# Patient Record
Sex: FEMALE | Race: OTHER | HISPANIC OR LATINO | URBAN - METROPOLITAN AREA
[De-identification: names, ages, dates, MRNs, and addresses within clinical notes are randomized per-mention and may not be internally consistent; named-entity substitution may affect disease eponyms.]

---

## 2023-12-02 ENCOUNTER — EMERGENCY (EMERGENCY)
Facility: HOSPITAL | Age: 48
LOS: 1 days | Discharge: HOME | End: 2023-12-02
Attending: EMERGENCY MEDICINE | Admitting: EMERGENCY MEDICINE
Payer: MEDICAID

## 2023-12-02 VITALS
DIASTOLIC BLOOD PRESSURE: 72 MMHG | HEART RATE: 64 BPM | SYSTOLIC BLOOD PRESSURE: 105 MMHG | RESPIRATION RATE: 16 BRPM | TEMPERATURE: 98 F | OXYGEN SATURATION: 100 %

## 2023-12-02 PROCEDURE — 99283 EMERGENCY DEPT VISIT LOW MDM: CPT

## 2023-12-02 NOTE — ED PROVIDER NOTE - PATIENT PORTAL LINK FT
You can access the FollowMyHealth Patient Portal offered by [facility] by registering at the following website: [email or web address]. By joining [facility]’s FollowMyHealth portal, you will also be able to view your health information using other applications (apps) compatible with our system.

## 2023-12-02 NOTE — ED PROVIDER NOTE - CLINICAL SUMMARY MEDICAL DECISION MAKING FREE TEXT BOX
Right minimally painful breast lump.  Patient requires outpatient imaging.  This is not appear to be an abscess.  I spoke with patient at length about outpatient mammogram and ultrasound.  Will ask for discharge labs to help facilitate appointment with GYN and mammogram.  Ibuprofen in the meanwhile.

## 2023-12-02 NOTE — ED PROVIDER NOTE - NSPTACCESSSVCSAPPTDETAILS_ED_ALL_ED_FT
Patient requires routine OB/GYN care but requires expedited care due to a breast lump found.  Patient requires immediate referral in order to get mammogram and breast ultrasound set up.

## 2023-12-02 NOTE — ED PROVIDER NOTE - PHYSICAL EXAMINATION
Right breast shows a 2 cm firm nodule in the 10 o'clock position of the right breast.  There is no overlying skin changes or edema or edema.  There is no nipple discharge.  There is no puckering of the skin.  It is located approximately 2 cm out from the areola.

## 2023-12-02 NOTE — ED PROVIDER NOTE - OBJECTIVE STATEMENT
48-year-old female with no significant past medical history presents the ED with right breast lump that was noted today while taking a shower.  Patient's last mammogram was in March of this year with no significant findings.  Patient notes that lump is minimally painful upon palpation.  Patient denies any skin changes or trauma or nipple discharge.  Patient denies any fevers or chills.  Patient's mother had breast cancer but that was due to 9/11.  No other family history of breast cancer.  Patient does not currently have a GYN and travels for work often.

## 2023-12-02 NOTE — ED PROVIDER NOTE - NSFOLLOWUPINSTRUCTIONS_ED_ALL_ED_FT
You may take ibuprofen 400 mg every 6-8 hours as needed for pain.  Please return to the ED if you notice any discharge in the nipple, fevers or chills or any changes of the skin.  To schedule-year-old mammogram ordered for facilities you may call the [facility] for advanced medicine at [phone]

## 2023-12-04 PROBLEM — Z78.9 OTHER SPECIFIED HEALTH STATUS: Chronic | Status: ACTIVE | Noted: 2023-12-02

## 2023-12-20 PROBLEM — Z00.00 ENCOUNTER FOR PREVENTIVE HEALTH EXAMINATION: Status: ACTIVE | Noted: 2023-12-20

## 2023-12-21 ENCOUNTER — NON-APPOINTMENT (OUTPATIENT)
Age: 48
End: 2023-12-21

## 2023-12-21 ENCOUNTER — APPOINTMENT (OUTPATIENT)
Dept: OBGYN | Facility: HOSPITAL | Age: 48
End: 2023-12-21
